# Patient Record
Sex: FEMALE | Race: ASIAN | Employment: UNEMPLOYED | URBAN - METROPOLITAN AREA
[De-identification: names, ages, dates, MRNs, and addresses within clinical notes are randomized per-mention and may not be internally consistent; named-entity substitution may affect disease eponyms.]

---

## 2015-11-04 LAB — LDL-C, EXTERNAL: 96

## 2015-12-01 LAB — COLONOSCOPY, EXTERNAL: NORMAL

## 2019-12-01 LAB — LDL-C, EXTERNAL: 96

## 2020-11-06 ENCOUNTER — TRANSCRIBE ORDER (OUTPATIENT)
Dept: SCHEDULING | Age: 55
End: 2020-11-06

## 2020-11-06 DIAGNOSIS — Z12.31 SCREENING MAMMOGRAM FOR HIGH-RISK PATIENT: Primary | ICD-10-CM

## 2020-11-27 ENCOUNTER — OFFICE VISIT (OUTPATIENT)
Dept: FAMILY MEDICINE CLINIC | Age: 55
End: 2020-11-27
Payer: OTHER GOVERNMENT

## 2020-11-27 VITALS
HEART RATE: 81 BPM | RESPIRATION RATE: 16 BRPM | SYSTOLIC BLOOD PRESSURE: 134 MMHG | BODY MASS INDEX: 22.1 KG/M2 | WEIGHT: 112.6 LBS | OXYGEN SATURATION: 99 % | DIASTOLIC BLOOD PRESSURE: 79 MMHG | HEIGHT: 60 IN | TEMPERATURE: 97.2 F

## 2020-11-27 DIAGNOSIS — Z12.31 ENCOUNTER FOR SCREENING MAMMOGRAM FOR MALIGNANT NEOPLASM OF BREAST: ICD-10-CM

## 2020-11-27 DIAGNOSIS — D12.6 ADENOMATOUS POLYP OF COLON, UNSPECIFIED PART OF COLON: ICD-10-CM

## 2020-11-27 DIAGNOSIS — E78.5 DYSLIPIDEMIA: ICD-10-CM

## 2020-11-27 DIAGNOSIS — Z00.01 ENCOUNTER FOR WELL ADULT EXAM WITH ABNORMAL FINDINGS: Primary | ICD-10-CM

## 2020-11-27 DIAGNOSIS — Z12.11 SCREENING FOR COLON CANCER: ICD-10-CM

## 2020-11-27 DIAGNOSIS — H40.9 GLAUCOMA OF LEFT EYE, UNSPECIFIED GLAUCOMA TYPE: ICD-10-CM

## 2020-11-27 DIAGNOSIS — Z11.59 ENCOUNTER FOR HEPATITIS C SCREENING TEST FOR LOW RISK PATIENT: ICD-10-CM

## 2020-11-27 PROBLEM — K63.5 COLON POLYP: Status: ACTIVE | Noted: 2020-11-27

## 2020-11-27 PROCEDURE — 99386 PREV VISIT NEW AGE 40-64: CPT | Performed by: FAMILY MEDICINE

## 2020-11-27 PROCEDURE — 99214 OFFICE O/P EST MOD 30 MIN: CPT | Performed by: FAMILY MEDICINE

## 2020-11-27 RX ORDER — LATANOPROST 50 UG/ML
SOLUTION/ DROPS OPHTHALMIC
COMMUNITY
Start: 2020-11-12

## 2020-11-27 RX ORDER — ADJUVANT AS01B/PF, VIAL 1 OF 2
1 VIAL (ML) INTRAMUSCULAR ONCE
Qty: 1 EACH | Refills: 1 | Status: SHIPPED | OUTPATIENT
Start: 2020-11-27 | End: 2020-11-27

## 2020-11-27 NOTE — PATIENT INSTRUCTIONS
1. Encounter for screening mammogram for malignant neoplasm of breast  Screening per orders, higher risk b/c of sister diagnosis before age 36, consider referral to breast center for high risk screening  - [facility] MAMMO BI SCREENING INCL CAD; Future    2. Glaucoma of left eye, unspecified glaucoma type  Mgmt per ophthalmology, compliance with drops    3. Adenomatous polyp of colon, unspecified part of colon  Recommend f/u cscope is due now, 5 y since last, referral placed  - REFERRAL TO GASTROENTEROLOGY    4. Screening for colon cancer  As above  - REFERRAL TO GASTROENTEROLOGY    5. Encounter for hepatitis C screening test for low risk patient  Routine screening  - HEPATITIS C AB; Future    6. Encounter for well adult exam with abnormal findings  Well adult, recommend healthy eating, exercise, maintain healthy weight and habits  Recommend routine screenings   Recommend vaccines and discussed today (pt declines flu shot)  Recommend annual visits  - [facility] MAMMO BI SCREENING INCL CAD; Future  - CBC W/O DIFF; Future  - METABOLIC PANEL, COMPREHENSIVE; Future  - LIPID PANEL; Future  - REFERRAL TO GASTROENTEROLOGY  - HEPATITIS C AB; Future  - varicella-zoster (Shingrix Adjuvant Component-PF) injection; 1 Each by IntraMUSCular route once for 1 dose. Indications: shingles vaccination  Dispense: 1 Each; Refill: 1  - REFERRAL TO OBSTETRICS AND GYNECOLOGY    7. Dyslipidemia  Recommend recheck with fasting labs, patient understanding, reviewed labs from 5 years ago in care everywhere   - CBC W/O DIFF; Future  - METABOLIC PANEL, COMPREHENSIVE; Future  - LIPID PANEL;  Future

## 2020-11-27 NOTE — PROGRESS NOTES
Chief Complaint   Patient presents with   [facility] Establish Care   1. Have you been to the ER, urgent care clinic since your last visit? Hospitalized since your last visit? No    2. Have you seen or consulted any other health care providers outside of the [address] [place] [address] since your last visit? Include any pap smears or colon screening.  No

## 2020-11-27 NOTE — PROGRESS NOTES
Family Medicine Initial Office Visit  Patient: Nelda Oates  1965, 54 y.o., female  Encounter Date: 11/27/2020    ASSESSMENT & PLAN    ICD-10-CM ICD-9-CM    1. Encounter for well adult exam with abnormal findings  Z00.01 V70.0 [facility] MAMMO BI SCREENING INCL CAD      CBC W/O DIFF      METABOLIC PANEL, COMPREHENSIVE      LIPID PANEL      REFERRAL TO GASTROENTEROLOGY      HEPATITIS C AB      varicella-zoster (Shingrix Adjuvant Component-PF) injection      REFERRAL TO OBSTETRICS AND GYNECOLOGY   2. Encounter for screening mammogram for malignant neoplasm of breast  Z12.31 V76.12 [facility] MAMMO BI SCREENING INCL CAD   3. Glaucoma of left eye, unspecified glaucoma type  H40.9 365.9    4. Adenomatous polyp of colon, unspecified part of colon  D12.6 211.3 REFERRAL TO GASTROENTEROLOGY   5. Screening for colon cancer  Z12.11 V76.51 REFERRAL TO GASTROENTEROLOGY   6. Encounter for hepatitis C screening test for low risk patient  Z11.59 V73.89 HEPATITIS C AB   7. Dyslipidemia  E78.5 272.4 CBC W/O DIFF      METABOLIC PANEL, COMPREHENSIVE      LIPID PANEL     Orders Placed This Encounter    [facility] MAMMO BI SCREENING INCL CAD     Standing Status:   Future     Standing Expiration Date:   5/30/2021     Order Specific Question:   Reason for Exam     Answer:   screening    AMB EXT LDL-C     This external order was created through the Results Console.  AMB EXT LDL-C     This external order was created through the Results Console.     CBC W/O DIFF     Standing Status:   Future     Standing Expiration Date:   85/31/1092    METABOLIC PANEL, COMPREHENSIVE     Standing Status:   Future     Standing Expiration Date:   11/27/2021    LIPID PANEL     Standing Status:   Future     Standing Expiration Date:   11/27/2021    HEPATITIS C AB     Standing Status:   Future     Standing Expiration Date:   11/27/2021   [facility]     Referral Priority:   Routine     Referral Type:   Consultation     Referral Reason:   Specialty Services Required     Referral Location:   [facility]     Referred to Provider:   Leighann Dee MD     Number of Visits Requested:   1   Elvira Kike OB/GYN ref [facility]     Referral Priority:   Routine     Referral Type:   Consultation     Referral Reason:   Specialty Services Required     Referred to Provider:   Mary Grace Luna MD     Number of Visits Requested:   1     COLONOSCOPY     This external order was created through the Results Console.  latanoprost (XALATAN) 0.005 % ophthalmic solution    varicella-zoster (Shingrix Adjuvant Component-PF) injection     Si Each by IntraMUSCular route once for 1 dose. Indications: shingles vaccination     Dispense:  1 Each     Refill:  1     Patient Instructions   1. Encounter for screening mammogram for malignant neoplasm of breast  Screening per orders, higher risk b/c of sister diagnosis before age 36, consider referral to breast center for high risk screening  - [facility] MAMMO BI SCREENING INCL CAD; Future    2. Glaucoma of left eye, unspecified glaucoma type  Mgmt per ophthalmology, compliance with drops    3. Adenomatous polyp of colon, unspecified part of colon  Recommend f/u cscope is due now, 5 y since last, referral placed  - REFERRAL TO GASTROENTEROLOGY    4. Screening for colon cancer  As above  - REFERRAL TO GASTROENTEROLOGY    5. Encounter for hepatitis C screening test for low risk patient  Routine screening  - HEPATITIS C AB; Future    6. Encounter for well adult exam with abnormal findings  Well adult, recommend healthy eating, exercise, maintain healthy weight and habits  Recommend routine screenings   Recommend vaccines and discussed today (pt declines flu shot)  Recommend annual visits  - [facility] MAMMO BI SCREENING INCL CAD; Future  - CBC W/O DIFF; Future  - METABOLIC PANEL, COMPREHENSIVE; Future  - LIPID PANEL; Future  - REFERRAL TO GASTROENTEROLOGY  - HEPATITIS C AB;  Future  - varicella-zoster (Shingrix Adjuvant Component-PF) injection; 1 Each by IntraMUSCular route once for 1 dose. Indications: shingles vaccination  Dispense: 1 Each; Refill: 1  - REFERRAL TO OBSTETRICS AND GYNECOLOGY    7. Dyslipidemia  Recommend recheck with fasting labs, patient understanding, reviewed labs from 5 years ago in care everywhere   - CBC W/O DIFF; Future  - METABOLIC PANEL, COMPREHENSIVE; Future  - LIPID PANEL; Future        CHIEF COMPLAINT  Chief Complaint   Patient presents with   Rio Saez Blend is a 54 y.o. female presenting today for establishing care. Patient works as not employed  Patient lives in a apt with  (has one 25year old college, a senior in college, home from [employer])  Patient was last seen by primary care over a year ago  Patient last saw a dentist last month  Patient last had eye exam a few weeks ago--[facility] [place]    Exercise: walking  Diet / Weight:healthy diet, stable    Tobacco:no  EtOH:no  Illicit Substances:no    Sexual Activity:yes one male partner  Last pap smear:   Last mammo: 2019 [facility] 1 neg    Patient was due for a physical, she knew she needed a mammo, her sister has a history of breast cancer, she was diagnosed at age 44 she reports to me. Lipids available from 2015 in chart, high HLD >90, LDL<100, TChol >200, TG 56    See care everywhere for last visits with her prior pcp and up to date labs that are available  Could not find pap in chart but do see cscope, hx of tubular adenoma  Review of Systems  A 12 point review of systems was negative except as noted here or in the HPI. OBJECTIVE  Visit Vitals  /79 (BP 1 Location: Left arm, BP Patient Position: Sitting)   Pulse 81   Temp 97.2 °F (36.2 °C) (Temporal)   Resp 16   Ht 5' (1.524 m)   Wt 112 lb 9.6 oz (51.1 kg)   SpO2 99%   BMI 21.99 kg/m²       Physical Exam  Vitals signs and nursing note reviewed. Constitutional:       General: She is not in acute distress. Appearance: Normal appearance. She is well-developed. She is not diaphoretic. HENT:      Head: Normocephalic and atraumatic. Right Ear: External ear normal.      Left Ear: External ear normal.      Mouth/Throat:      Comments: Mask not removed  Eyes:      General: No scleral icterus. Right eye: No discharge. Left eye: No discharge. Extraocular Movements: Extraocular movements intact. Conjunctiva/sclera: Conjunctivae normal.      Pupils: Pupils are equal, round, and reactive to light. Neck:      Musculoskeletal: Normal range of motion and neck supple. Vascular: No carotid bruit. Cardiovascular:      Rate and Rhythm: Normal rate and regular rhythm. Heart sounds: Normal heart sounds. No murmur. No friction rub. No gallop. Pulmonary:      Effort: Pulmonary effort is normal. No respiratory distress. Breath sounds: Normal breath sounds. No stridor. No wheezing, rhonchi or rales. Abdominal:      General: Bowel sounds are normal. There is no distension. Palpations: Abdomen is soft. Tenderness: There is no abdominal tenderness. Musculoskeletal: Normal range of motion. General: No tenderness. Right lower leg: No edema. Left lower leg: No edema. Lymphadenopathy:      Cervical: No cervical adenopathy. Skin:     General: Skin is warm and dry. Capillary Refill: Capillary refill takes less than 2 seconds. Findings: No rash. Neurological:      General: No focal deficit present. Mental Status: She is alert and oriented to person, place, and time. Coordination: Coordination normal.      Gait: Gait normal.   Psychiatric:         Mood and Affect: Mood normal.         Behavior: Behavior normal.         Thought Content:  Thought content normal.         Judgment: Judgment normal.         Results for orders placed or performed in visit on 11/27/20   AMB EXT LDL-C   Result Value Ref Range    LDL-C, External 96    AMB EXT LDL-C   Result Value Ref Range    LDL-C, External 96     COLONOSCOPY   Result Value Ref Range    Colonoscopy, External one polyp        HISTORICAL  Reviewed and updated today, and as noted below:    History reviewed. No pertinent past medical history. History reviewed. No pertinent surgical history. Family History   Problem Relation Age of Onset    No Known Problems Mother     Cancer Father     Cancer Sister      Social History     Tobacco Use   Smoking Status Never Smoker   Smokeless Tobacco Never Used     Social History     Socioeconomic History    Marital status:      Spouse name: Not on file    Number of children: Not on file    Years of education: Not on file    Highest education level: Not on file   Tobacco Use    Smoking status: Never Smoker    Smokeless tobacco: Never Used     No Known Allergies    Office Visit on 11/27/2020   Component Date Value Ref Range Status    Colonoscopy, External 12/01/2015 one polyp   Final    LDL-C, External 12/01/2019 96   Final    LDL-C, External 11/04/2015 96   Final         Tyler De Paz MD  [facility]  11/27/20 10:21 AM    Portions of this note may have been populated using smart dictation software and may have \"sounds-like\" errors present. Pt was counseled on risks, benefits and alternatives of treatment options. All questions were asked and answered and the patient was agreeable with the treatment plan as outlined.

## 2020-11-30 DIAGNOSIS — Z11.59 ENCOUNTER FOR HEPATITIS C SCREENING TEST FOR LOW RISK PATIENT: ICD-10-CM

## 2020-11-30 DIAGNOSIS — Z00.01 ENCOUNTER FOR WELL ADULT EXAM WITH ABNORMAL FINDINGS: ICD-10-CM

## 2020-11-30 DIAGNOSIS — E78.5 DYSLIPIDEMIA: ICD-10-CM

## 2020-11-30 LAB
ALBUMIN SERPL-MCNC: 4.1 G/DL (ref 3.5–5)
ALBUMIN/GLOB SERPL: 1.1 {RATIO} (ref 1.1–2.2)
ALP SERPL-CCNC: 58 U/L (ref 45–117)
ALT SERPL-CCNC: 33 U/L (ref 12–78)
ANION GAP SERPL CALC-SCNC: 5 MMOL/L (ref 5–15)
AST SERPL-CCNC: 24 U/L (ref 15–37)
BILIRUB SERPL-MCNC: 0.6 MG/DL (ref 0.2–1)
BUN SERPL-MCNC: 8 MG/DL (ref 6–20)
BUN/CREAT SERPL: 13 (ref 12–20)
CALCIUM SERPL-MCNC: 9.5 MG/DL (ref 8.5–10.1)
CHLORIDE SERPL-SCNC: 107 MMOL/L (ref 97–108)
CHOLEST SERPL-MCNC: 263 MG/DL
CO2 SERPL-SCNC: 29 MMOL/L (ref 21–32)
CREAT SERPL-MCNC: 0.63 MG/DL (ref 0.55–1.02)
ERYTHROCYTE [DISTWIDTH] IN BLOOD BY AUTOMATED COUNT: 11.9 % (ref 11.5–14.5)
GLOBULIN SER CALC-MCNC: 3.8 G/DL (ref 2–4)
GLUCOSE SERPL-MCNC: 87 MG/DL (ref 65–100)
HCT VFR BLD AUTO: 43.5 % (ref 35–47)
HCV AB SERPL QL IA: NONREACTIVE
HCV COMMENT,HCGAC: NORMAL
HDLC SERPL-MCNC: 92 MG/DL
HDLC SERPL: 2.9 {RATIO} (ref 0–5)
HGB BLD-MCNC: 14.1 G/DL (ref 11.5–16)
LDLC SERPL CALC-MCNC: 159.6 MG/DL (ref 0–100)
LIPID PROFILE,FLP: ABNORMAL
MCH RBC QN AUTO: 30.7 PG (ref 26–34)
MCHC RBC AUTO-ENTMCNC: 32.4 G/DL (ref 30–36.5)
MCV RBC AUTO: 94.6 FL (ref 80–99)
NRBC # BLD: 0 K/UL (ref 0–0.01)
NRBC BLD-RTO: 0 PER 100 WBC
PLATELET # BLD AUTO: 295 K/UL (ref 150–400)
PMV BLD AUTO: 9.9 FL (ref 8.9–12.9)
POTASSIUM SERPL-SCNC: 4.3 MMOL/L (ref 3.5–5.1)
PROT SERPL-MCNC: 7.9 G/DL (ref 6.4–8.2)
RBC # BLD AUTO: 4.6 M/UL (ref 3.8–5.2)
SODIUM SERPL-SCNC: 141 MMOL/L (ref 136–145)
TRIGL SERPL-MCNC: 57 MG/DL (ref ?–150)
VLDLC SERPL CALC-MCNC: 11.4 MG/DL
WBC # BLD AUTO: 6.1 K/UL (ref 3.6–11)

## 2020-12-02 NOTE — PROGRESS NOTES
Hep c screening negative  Blood count normal  Electrolytes, liver and kidney functions are normal  Borderline high LDL (lousy) cholesterol and high HDL cholesterol are combining to make total cholesterol high.  I would not recommend yet using a medication approach for this, healthy habits and exercise are goals but this is likely inherited or genetic and at some point we might need to talk about using medications to modify your vascular/cardiac risk

## 2020-12-11 ENCOUNTER — TELEPHONE (OUTPATIENT)
Dept: FAMILY MEDICINE CLINIC | Age: 55
End: 2020-12-11

## 2020-12-11 DIAGNOSIS — H40.1122 PRIMARY OPEN-ANGLE GLAUCOMA, LEFT EYE, MODERATE STAGE: Primary | ICD-10-CM

## 2020-12-11 NOTE — TELEPHONE ENCOUNTER
Radha referral request faxed by [facility] for pt's appointment today with Dr. Dustin Lemus. Diagnosis code H40.1122.  Ldm

## 2020-12-11 NOTE — TELEPHONE ENCOUNTER
Radha referral submitted, but per plan, pt is not enrolled in [facility] so no referral is required for this type of service. Faxed copy of this notice to Dr. Carley Jones office for billing purposes.  Eri

## 2020-12-13 ENCOUNTER — HOSPITAL ENCOUNTER (OUTPATIENT)
Dept: LAB | Age: 55
Discharge: HOME | End: 2020-12-13
Payer: OTHER GOVERNMENT

## 2020-12-13 ENCOUNTER — TRANSCRIBE ORDER (OUTPATIENT)
Dept: REGISTRATION | Age: 55
End: 2020-12-13

## 2020-12-13 DIAGNOSIS — Z01.812 PRE-PROCEDURAL LABORATORY EXAMINATIONS: ICD-10-CM

## 2020-12-13 DIAGNOSIS — Z01.812 PRE-PROCEDURAL LABORATORY EXAMINATIONS: Primary | ICD-10-CM

## 2020-12-13 PROCEDURE — 87635 SARS-COV-2 COVID-19 AMP PRB: CPT

## 2020-12-14 LAB — SARS-COV-2, COV2NT: NOT DETECTED

## 2020-12-15 NOTE — PROGRESS NOTES
[facility] - [place], [place]  Endoscopy Preprocedure Instructions      1. On the day of your surgery, please report to registration located on the 2nd floor of the  [facility]. yes    2. You must have a responsible adult to drive you to the hospital, stay at the hospital during your procedure and drive you home. If they leave your procedure will not be started (no exceptions). yes    3. Do not have anything to eat or drink (including water, gum, mints, coffee, and juice) after midnight. This does not apply to the medications you were instructed to take by your physician. yesIf you are currently taking Plavix, Coumadin, Aspirin, or other blood-thinning agents, contact your physician for special instructions. not applicable,    4. If you are having a procedure that requires bowel prep: We recommend that you have only clear liquids the day before your procedure and begin your bowel prep by 5:00 pm.  You may continue to drink clear liquids until midnight. If for any reason you are not able to complete your prep please notify your physician immediately. yes    5. Have a list of all current medications, including vitamins, herbal supplements and any other over the counter medications. yes    6. If you wear glasses, contacts, dentures and/or hearing aids, they may be removed prior to procedure, please bring a case to store them in. yes    7. You should understand that if you do not follow these instructions your procedure may be cancelled. If your physical condition changes (I.e. fever, cold or flu) please contact your doctor as soon as possible. 8. It is important that you be on time.   If for any reason you are unable to keep your appointment please call )- the day of or your physicians office prior to your scheduled procedure

## 2020-12-17 ENCOUNTER — HOSPITAL ENCOUNTER (OUTPATIENT)
Age: 55
Setting detail: OUTPATIENT SURGERY
Discharge: HOME | End: 2020-12-17
Attending: INTERNAL MEDICINE | Admitting: INTERNAL MEDICINE
Payer: OTHER GOVERNMENT

## 2020-12-17 ENCOUNTER — ANESTHESIA EVENT (OUTPATIENT)
Dept: ENDOSCOPY | Age: 55
End: 2020-12-17
Payer: OTHER GOVERNMENT

## 2020-12-17 ENCOUNTER — ANESTHESIA (OUTPATIENT)
Dept: ENDOSCOPY | Age: 55
End: 2020-12-17
Payer: OTHER GOVERNMENT

## 2020-12-17 VITALS
SYSTOLIC BLOOD PRESSURE: 110 MMHG | HEART RATE: 72 BPM | HEIGHT: 60 IN | BODY MASS INDEX: 21.81 KG/M2 | WEIGHT: 111.11 LBS | RESPIRATION RATE: 18 BRPM | DIASTOLIC BLOOD PRESSURE: 68 MMHG | OXYGEN SATURATION: 100 % | TEMPERATURE: 97.8 F

## 2020-12-17 PROCEDURE — 76040000019: Performed by: INTERNAL MEDICINE

## 2020-12-17 PROCEDURE — 74011250636 HC RX REV CODE- 250/636: Performed by: NURSE ANESTHETIST, CERTIFIED REGISTERED

## 2020-12-17 PROCEDURE — 76060000031 HC ANESTHESIA FIRST 0.5 HR: Performed by: INTERNAL MEDICINE

## 2020-12-17 PROCEDURE — 74011000250 HC RX REV CODE- 250: Performed by: NURSE ANESTHETIST, CERTIFIED REGISTERED

## 2020-12-17 PROCEDURE — 2709999900 HC NON-CHARGEABLE SUPPLY: Performed by: INTERNAL MEDICINE

## 2020-12-17 RX ORDER — ATROPINE SULFATE 0.1 MG/ML
0.4 INJECTION INTRAVENOUS
Status: DISCONTINUED | OUTPATIENT
Start: 2020-12-17 | End: 2020-12-17 | Stop reason: HOSPADM

## 2020-12-17 RX ORDER — PROPOFOL 10 MG/ML
INJECTION, EMULSION INTRAVENOUS AS NEEDED
Status: DISCONTINUED | OUTPATIENT
Start: 2020-12-17 | End: 2020-12-17 | Stop reason: HOSPADM

## 2020-12-17 RX ORDER — DEXTROMETHORPHAN/PSEUDOEPHED 2.5-7.5/.8
1.2 DROPS ORAL
Status: DISCONTINUED | OUTPATIENT
Start: 2020-12-17 | End: 2020-12-17 | Stop reason: HOSPADM

## 2020-12-17 RX ORDER — MIDAZOLAM HYDROCHLORIDE 1 MG/ML
.25-5 INJECTION, SOLUTION INTRAMUSCULAR; INTRAVENOUS
Status: DISCONTINUED | OUTPATIENT
Start: 2020-12-17 | End: 2020-12-17 | Stop reason: HOSPADM

## 2020-12-17 RX ORDER — LIDOCAINE HYDROCHLORIDE 20 MG/ML
INJECTION, SOLUTION EPIDURAL; INFILTRATION; INTRACAUDAL; PERINEURAL AS NEEDED
Status: DISCONTINUED | OUTPATIENT
Start: 2020-12-17 | End: 2020-12-17 | Stop reason: HOSPADM

## 2020-12-17 RX ORDER — SODIUM CHLORIDE 9 MG/ML
INJECTION, SOLUTION INTRAVENOUS
Status: DISCONTINUED | OUTPATIENT
Start: 2020-12-17 | End: 2020-12-17 | Stop reason: HOSPADM

## 2020-12-17 RX ORDER — EPINEPHRINE 0.1 MG/ML
1 INJECTION INTRACARDIAC; INTRAVENOUS
Status: DISCONTINUED | OUTPATIENT
Start: 2020-12-17 | End: 2020-12-17 | Stop reason: HOSPADM

## 2020-12-17 RX ORDER — FLUMAZENIL 0.1 MG/ML
0.2 INJECTION INTRAVENOUS
Status: DISCONTINUED | OUTPATIENT
Start: 2020-12-17 | End: 2020-12-17 | Stop reason: HOSPADM

## 2020-12-17 RX ORDER — NALOXONE HYDROCHLORIDE 0.4 MG/ML
0.4 INJECTION, SOLUTION INTRAMUSCULAR; INTRAVENOUS; SUBCUTANEOUS
Status: DISCONTINUED | OUTPATIENT
Start: 2020-12-17 | End: 2020-12-17 | Stop reason: HOSPADM

## 2020-12-17 RX ORDER — PROPOFOL 10 MG/ML
INJECTION, EMULSION INTRAVENOUS
Status: DISCONTINUED | OUTPATIENT
Start: 2020-12-17 | End: 2020-12-17 | Stop reason: HOSPADM

## 2020-12-17 RX ORDER — SODIUM CHLORIDE 9 MG/ML
50 INJECTION, SOLUTION INTRAVENOUS CONTINUOUS
Status: DISCONTINUED | OUTPATIENT
Start: 2020-12-17 | End: 2020-12-17 | Stop reason: HOSPADM

## 2020-12-17 RX ADMIN — LIDOCAINE HYDROCHLORIDE 20 MG: 20 INJECTION, SOLUTION INTRAVENOUS at 11:07

## 2020-12-17 RX ADMIN — PROPOFOL 120 MCG/KG/MIN: 10 INJECTION, EMULSION INTRAVENOUS at 11:06

## 2020-12-17 RX ADMIN — SODIUM CHLORIDE: 9 INJECTION, SOLUTION INTRAVENOUS at 11:00

## 2020-12-17 RX ADMIN — PROPOFOL 40 MG: 10 INJECTION, EMULSION INTRAVENOUS at 11:07

## 2020-12-17 NOTE — PROGRESS NOTES
Endoscopy discharge instructions have been reviewed and given to patient. The patient verbalized understanding and acceptance of instructions. Dr. Corina Ball discussed with patient procedure findings and next steps.

## 2020-12-17 NOTE — H&P
Yossi Conroy M.D.  [phone]            History and Physical       NAME:  Stella Robison   :   1965   MRN:   [id number]       Referring Physician:  Dr. Alonso Parks Date: 2020 11:05 AM    Chief Complaint:  Colon cancer screening    History of Present Illness:  Patient is a 54 y.o. who is seen for colon cancer screening. Denies any ongoing GI complaints. PMH:  History reviewed. No pertinent past medical history. PSH:  History reviewed. No pertinent surgical history. Allergies:  No Known Allergies    Home Medications:  Prior to Admission Medications   Prescriptions Last Dose Informant Patient Reported?  Taking?   latanoprost (XALATAN) 0.005 % ophthalmic solution 2020 at Unknown time  Yes Yes      Facility-Administered Medications: None       Hospital Medications:  Current Facility-Administered Medications   Medication Dose Route Frequency    0.9% sodium chloride infusion  50 mL/hr IntraVENous CONTINUOUS    midazolam (VERSED) injection 0.25-5 mg  0.25-5 mg IntraVENous Multiple    naloxone (NARCAN) injection 0.4 mg  0.4 mg IntraVENous Multiple    flumazeniL (ROMAZICON) 0.1 mg/mL injection 0.2 mg  0.2 mg IntraVENous Multiple    simethicone (MYLICON) 43YZ/4.8WS oral drops 80 mg  1.2 mL Oral Multiple    atropine injection 0.4 mg  0.4 mg IntraVENous ONCE PRN    EPINEPHrine (ADRENALIN) 0.1 mg/mL syringe 1 mg  1 mg Endoscopically ONCE PRN     Facility-Administered Medications Ordered in Other Encounters   Medication Dose Route Frequency    0.9% sodium chloride infusion   IntraVENous CONTINUOUS       Social History:  Social History     Tobacco Use    Smoking status: Never Smoker    Smokeless tobacco: Never Used   Substance Use Topics    Alcohol use: Not Currently       Family History:  Family History   Problem Relation Age of Onset    No Known Problems Mother     Cancer Father     Cancer Sister              Review of Systems:      Constitutional: negative fever, negative chills, negative weight loss  Eyes:   negative visual changes  ENT:   negative sore throat, tongue or lip swelling  Respiratory:  negative cough, negative dyspnea  Cards:  negative for chest pain, palpitations, lower extremity edema  GI:   See HPI  :  negative for frequency, dysuria  Integument:  negative for rash and pruritus  Heme:  negative for easy bruising and gum/nose bleeding  Musculoskel: negative for myalgias,  back pain and muscle weakness  Neuro: negative for headaches, dizziness, vertigo  Psych:  negative for feelings of anxiety, depression       Objective:     Patient Vitals for the past 8 hrs:   BP Temp Pulse Resp SpO2 Height Weight   12/17/20 1007 111/72 98.3 °F (36.8 °C) 80 18 97 % 5' (1.524 m) 50.4 kg (111 lb 1.8 oz)     No intake/output data recorded. No intake/output data recorded. EXAM:     NEURO-a&o   HEENT-wnl   LUNGS-clear    COR-regular rate and rhythym     ABD-soft , no tenderness, no rebound, good bs     EXT-no edema     Data Review     No results for input(s): WBC, HGB, HCT, PLT, HGBEXT, HCTEXT, PLTEXT in the last 72 hours. No results for input(s): NA, K, CL, CO2, BUN, CREA, GLU, PHOS, CA in the last 72 hours. No results for input(s): AP, TBIL, TP, ALB, GLOB, GGT, AML, LPSE in the last 72 hours. No lab exists for component: SGOT, GPT, AMYP, HLPSE  No results for input(s): INR, PTP, APTT, INREXT in the last 72 hours. Patient Active Problem List   Diagnosis Code    Colon polyp K63.5    Family history of breast cancer in sister Z80.2      Assessment:   · Colon cancer screening   Plan:   · Colonoscopy today.      Signed By: Natalee Heredia MD     12/17/2020  11:05 AM

## 2020-12-17 NOTE — ANESTHESIA POSTPROCEDURE EVALUATION
Procedure(s):  COLONOSCOPY.     MAC    Anesthesia Post Evaluation      Multimodal analgesia: multimodal analgesia used between 6 hours prior to anesthesia start to PACU discharge  Patient location during evaluation: PACU  Patient participation: complete - patient participated  Level of consciousness: awake and alert  Pain management: adequate  Airway patency: patent  Anesthetic complications: no  Cardiovascular status: acceptable  Respiratory status: acceptable  Hydration status: acceptable  Post anesthesia nausea and vomiting:  none      INITIAL Post-op Vital signs:   Vitals Value Taken Time   /68 12/17/20 1152   Temp 36.6 °C (97.8 °F) 12/17/20 1131   Pulse 72 12/17/20 1152   Resp 18 12/17/20 1152   SpO2 100 % 12/17/20 1152

## 2020-12-17 NOTE — ANESTHESIA PREPROCEDURE EVALUATION
Relevant Problems   No relevant active problems       Anesthetic History   No history of anesthetic complications            Review of Systems / Medical History  Patient summary reviewed, nursing notes reviewed and pertinent labs reviewed    Pulmonary  Within defined limits            Pertinent negatives: No asthma and recent URI     Neuro/Psych   Within defined limits           Cardiovascular  Within defined limits              Pertinent negatives: No hypertension  Exercise tolerance: >4 METS     GI/Hepatic/Renal  Within defined limits              Endo/Other  Within defined limits           Other Findings              Physical Exam    Airway  Mallampati: I  TM Distance: 4 - 6 cm  Neck ROM: normal range of motion   Mouth opening: Normal     Cardiovascular  Regular rate and rhythm,  S1 and S2 normal,  no murmur, click, rub, or gallop             Dental    Dentition: Lower dentition intact and Upper dentition intact     Pulmonary  Breath sounds clear to auscultation               Abdominal         Other Findings            Anesthetic Plan    ASA: 1  Anesthesia type: MAC          Induction: Intravenous  Anesthetic plan and risks discussed with: Patient

## 2020-12-17 NOTE — PROGRESS NOTES
Miladisian Estrada  1965  [id number]    Situation:  Verbal report received from: Marija Michel RN   Procedure: Procedure(s):  COLONOSCOPY    Background:    Preoperative diagnosis: SCREENING  Postoperative diagnosis: Diverticulosis/hemorrhoids    :  Dr. Madison Keenan  Assistant(s): Endoscopy Technician-1: Sanjeev Mercado  Endoscopy RN-1: Roberto Olguin RN    Specimens: * No specimens in log *  H. Pylori  no    Assessment:    Anesthesia gave intra-procedure sedation and medications, see anesthesia flow sheet no    Intravenous fluids: NS@ KVO     Vital signs stable     Abdominal assessment: round and soft     Recommendation:  Discharge patient per MD order.   Return to floor  Family or Friend   Permission to share finding with family or friend yes

## 2020-12-17 NOTE — DISCHARGE INSTRUCTIONS
[facility]. Lacey Vidal M.D.  [phone]            COLON DISCHARGE INSTRUCTIONS       2020    Valerio Estrada  :  1965  [facility] Medical Record Number:  [id number]      COLONOSCOPY FINDINGS:  Your colonoscopy showed mild diverticulosis and small internal hemorrhoids, otherwise mucosa within normal throughout the colon. DISCOMFORT:  Redness at IV site- apply warm compress to area; if redness or soreness persist- contact your physician  There may be a slight amount of blood passed from the rectum  Gaseous discomfort- walking, belching will help relieve any discomfort  You may not operate a vehicle for 12 hours  You may not engage in an occupation involving machinery or appliances for rest of today  You may not drink alcoholic beverages for at least 12 hours  Avoid making any critical decisions for at least 24 hour  DIET:   High fiber diet. - however -  remember your colon is empty and a heavy meal will produce gas. Avoid these foods:  vegetables, fried / greasy foods, carbonated drinks for today     ACTIVITY:  You may resume your normal daily activities it is recommended that you spend the remainder of the day resting -  avoid any strenuous activity. CALL M.D. ANY SIGN OF:   Increasing pain, nausea, vomiting  Abdominal distension (swelling)  New increased bleeding (oral or rectal)  Fever (chills)  Pain in chest area  Bloody discharge from nose or mouth   Shortness of breath    Follow-up Instructions:   Call Dr. Mack Darnell if any questions or problems. Telephone # [phone]    Should have a repeat colonoscopy in 5 years.

## 2020-12-17 NOTE — PROCEDURES
Amador Morales M.D.  [phone]            2020          Colonoscopy Operative Report  Doris Sepulveda  :  1965  [facility] Medical Record Number:  [id number]      Indications:    Personal history of colon polyps (screening only)     :  Tho Isaac MD    Referring Provider: Dulce Travis MD    Sedation:  MAC anesthesia    Pre-Procedural Exam:      Airway: clear,  No airway problems anticipated  Heart: RRR, without gallops or rubs  Lungs: clear bilaterally without wheezes, crackles, or rhonchi  Abdomen: soft, nontender, nondistended, bowel sounds present  Mental Status: awake, alert and oriented to person, place and time     Procedure Details:  After informed consent was obtained with all risks and benefits of procedure explained and preoperative exam completed, the patient was taken to the endoscopy suite and placed in the left lateral decubitus position. Upon sequential sedation as per above, a digital rectal exam was performed. The Olympus videocolonoscope  was inserted in the rectum and carefully advanced to the cecum, which was identified by the ileocecal valve. The quality of preparation was good. The colonoscope was slowly withdrawn with careful inspection and evaluation between folds. Retroflexion in the rectum was performed. Findings:   Terminal Ileum: not intubated  Cecum: normal  Ascending Colon: no mucosal lesion appreciated  mild diverticulosis;  Transverse Colon: normal  Descending Colon: normal  Sigmoid: normal  Rectum: no mucosal lesion appreciated  Grade 1 internal hemorrhoid(s); Interventions:  none    Specimen Removed:  none    Complications: None. EBL:  None. Impression:  Mild diverticulosis and small internal hemorrhoids, otherwise mucosa within normal    Recommendations:  -Repeat colonoscopy in 5 years.   -High fiber diet.    -Resume normal medication(s).      Discharge Disposition:  Home in the company of a  when able to ambulate.     Neris Dyer MD  12/17/2020  11:24 AM

## 2020-12-18 ENCOUNTER — OFFICE VISIT (OUTPATIENT)
Dept: OBGYN CLINIC | Age: 55
End: 2020-12-18
Payer: OTHER GOVERNMENT

## 2020-12-18 VITALS — BODY MASS INDEX: 21.99 KG/M2 | HEIGHT: 60 IN | WEIGHT: 112 LBS

## 2020-12-18 DIAGNOSIS — Z11.51 SCREENING FOR HPV (HUMAN PAPILLOMAVIRUS): ICD-10-CM

## 2020-12-18 DIAGNOSIS — Z01.419 ENCNTR FOR GYN EXAM (GENERAL) (ROUTINE) W/O ABN FINDINGS: Primary | ICD-10-CM

## 2020-12-18 PROCEDURE — 99386 PREV VISIT NEW AGE 40-64: CPT | Performed by: OBSTETRICS & GYNECOLOGY

## 2020-12-18 NOTE — PROGRESS NOTES
Stella Robison is a No obstetric history on file. ,  54 y.o. female   who presents for her annual checkup. She is having no significant problems. Recently moved from [place], [place]. Menstrual status:    Her periods are absent in flow. She denies dysmenorrhea. She reports no premenstrual symptoms. The patient is not using HRT. Contraception:    The current method of family planning is post menopausal status. Sexual history:    She  has no history on file for sexual activity. Medical conditions:    Since her last annual GYN exam about 2015, per patient she has had the following changes in her health history: none. Pap and Mammogram History:    Her most recent Pap smear was normal obtained 2015, per patient. The patient has her mammogram scheduled next month. .    Breast Cancer History/Substance Abuse:    She has a family history of breast cancer. Sister at age 45. Osteoporosis History:    Family history does not include a first or second degree relative with osteopenia or osteoporosis. A bone density scan was not obtained. She is currently not taking calcium and vit D. No past medical history on file. Past Surgical History:   Procedure Laterality Date    COLONOSCOPY N/A 12/17/2020    COLONOSCOPY performed by Margery Skiff, MD at [facility] END[facility]     Current Outpatient Medications   Medication Sig Dispense Refill    latanoprost (XALATAN) 0.005 % ophthalmic solution        Allergies: Patient has no known allergies.    Social History     Socioeconomic History    Marital status:      Spouse name: Not on file    Number of children: Not on file    Years of education: Not on file    Highest education level: Not on file   Occupational History    Not on file   Social Needs    Financial resource strain: Not on file    Food insecurity     Worry: Not on file     Inability: Not on file    Transportation needs     Medical: Not on file     Non-medical: Not on file Tobacco Use    Smoking status: Never Smoker    Smokeless tobacco: Never Used   Substance and Sexual Activity    Alcohol use: Not Currently    Drug use: Not Currently    Sexual activity: Not on file   Lifestyle    Physical activity     Days per week: Not on file     Minutes per session: Not on file    Stress: Not on file   Relationships    Social connections     Talks on phone: Not on file     Gets together: Not on file     Attends Confucianist service: Not on file     Active member of club or organization: Not on file     Attends meetings of clubs or organizations: Not on file     Relationship status: Not on file    Intimate partner violence     Fear of current or ex partner: Not on file     Emotionally abused: Not on file     Physically abused: Not on file     Forced sexual activity: Not on file   Other Topics Concern    Not on file   Social History Narrative    Not on file     Tobacco History:  reports that she has never smoked. She has never used smokeless tobacco.  Alcohol Abuse:  reports previous alcohol use. Drug Abuse:  reports previous drug use.   Patient Active Problem List   Diagnosis Code    Colon polyp K63.5    Family history of breast cancer in sister Z80.2         Review of Systems - History obtained from the patient  Constitutional: negative for weight loss, fever, night sweats  HEENT: negative for hearing loss, earache, congestion, snoring, sorethroat  CV: negative for chest pain, palpitations, edema  Resp: negative for cough, shortness of breath, wheezing  GI: negative for change in bowel habits, abdominal pain, black or bloody stools  : negative for frequency, dysuria, hematuria, vaginal discharge  MSK: negative for back pain, joint pain, muscle pain  Breast: negative for breast lumps, nipple discharge, galactorrhea  Skin :negative for itching, rash, hives  Neuro: negative for dizziness, headache, confusion, weakness  Psych: negative for anxiety, depression, change in mood  Heme/lymph: negative for bleeding, bruising, pallor    Physical Exam    Visit Vitals  Ht 5' (1.524 m)   Wt 112 lb (50.8 kg)   BMI 21.87 kg/m²     Constitutional  · Appearance: well-nourished, well developed, alert, in no acute distress    HENT  · Head and Face: appears normal    Neck  · Inspection/Palpation: normal appearance, no masses or tenderness  · Lymph Nodes: no lymphadenopathy present  · Thyroid: gland size normal, nontender, no nodules or masses present on palpation    Chest  · Respiratory Effort: breathing normal  · Auscultation: normal breath sounds    Cardiovascular  · Heart:  · Auscultation: regular rate and rhythm without murmur    Breasts  · Inspection of Breasts: breasts symmetrical, no skin changes, no discharge present, nipple appearance normal, no skin retraction present  · Palpation of Breasts and Axillae: no masses present on palpation, no breast tenderness  · Axillary Lymph Nodes: no lymphadenopathy present    Gastrointestinal  · Abdominal Examination: abdomen non-tender to palpation, normal bowel sounds, no masses present  · Liver and spleen: no hepatomegaly present, spleen not palpable  · Hernias: no hernias identified    Skin  · General Inspection: no rash, no lesions identified    Neurologic/Psychiatric  · Mental Status:  · Orientation: grossly oriented to person, place and time  · Mood and Affect: mood normal, affect appropriate    Genitourinary  · External Genitalia: normal appearance for age, no discharge present, no tenderness present, no inflammatory lesions present, no masses present, no atrophy present  · Vagina: normal vaginal vault without central or paravaginal defects, no discharge present, no inflammatory lesions present, no masses present  · Bladder: non-tender to palpation  · Urethra: appears normal  · Cervix: normal   · Uterus: normal size, shape and consistency  · Adnexa: no adnexal tenderness present, no adnexal masses present  · Perineum: perineum within normal limits, no evidence of trauma, no rashes or skin lesions present  · Anus: anus within normal limits, no hemorrhoids present  · Inguinal Lymph Nodes: no lymphadenopathy present    Assessment:  Routine gynecologic examination  Her current medical status is satisfactory with no evidence of significant gynecologic issues.     Plan:  Counseled re: diet, exercise, healthy lifestyle  Return for yearly wellness visits  Rec annual mammogram  Pap/HPV

## 2020-12-18 NOTE — PROGRESS NOTES
Stella Robison is a No obstetric history on file. ,  54 y.o. female  whose LMP was on  who presents for her annual checkup. She is having {problem:21965}. Menstrual status: 
 
Her periods are absent She denies dysmenorrhea. She reports no premenstrual symptoms. The patient is not using HRT. Contraception: The current method of family planning is post menopausal status. Sexual history: She  has no history on file for sexual activity. Medical conditions: 
 
Since her last annual GYN exam about {years:21964} ago, she has had the following changes in her health history: none. Pap and Mammogram History: 
 
Her most recent Pap smear was{NORMAL_ABNORMAL:20128::\"see report\"} obtained {Numbers; 1-4 :[id number]} year(s) ago. The patient is scheduled for mammogram 1/5/2021. Breast Cancer History/Substance Abuse: She has {No/  **:46500::\"a\"} family history of breast cancer. Osteoporosis History: 
 
Family history does not include a first or second degree relative with osteopenia or osteoporosis. A bone density scan was obtained *** and revealed a normal scan, osteopenia, osteoporosis. She is currently taking calcium and vit D. No past medical history on file. Past Surgical History:  
Procedure Laterality Date  COLONOSCOPY N/A 12/17/2020 COLONOSCOPY performed by Margery Skiff, MD at [address] [place] [address] Current Outpatient Medications Medication Sig Dispense Refill  latanoprost (XALATAN) 0.005 % ophthalmic solution Allergies: Patient has no known allergies. Social History Socioeconomic History  Marital status:  Spouse name: Not on file  Number of children: Not on file  Years of education: Not on file  Highest education level: Not on file Occupational History  Not on file Social Needs  Financial resource strain: Not on file  Food insecurity Worry: Not on file Inability: Not on file  Transportation needs Medical: Not on file Non-medical: Not on file Tobacco Use  Smoking status: Never Smoker  Smokeless tobacco: Never Used Substance and Sexual Activity  Alcohol use: Not Currently  Drug use: Not Currently  Sexual activity: Not on file Lifestyle  Physical activity Days per week: Not on file Minutes per session: Not on file  Stress: Not on file Relationships  Social connections Talks on phone: Not on file Gets together: Not on file Attends Christian service: Not on file Active member of club or organization: Not on file Attends meetings of clubs or organizations: Not on file Relationship status: Not on file  Intimate partner violence Fear of current or ex partner: Not on file Emotionally abused: Not on file Physically abused: Not on file Forced sexual activity: Not on file Other Topics Concern  Not on file Social History Narrative  Not on file Tobacco History:  reports that she has never smoked. She has never used smokeless tobacco. 
Alcohol Abuse:  reports previous alcohol use. Drug Abuse:  reports previous drug use. Patient Active Problem List  
Diagnosis Code  Colon polyp K63.5  Family history of breast cancer in sister Z80.2 Review of Systems - History obtained from the patient Constitutional: negative for weight loss, fever, night sweats HEENT: negative for hearing loss, earache, congestion, snoring, sorethroat CV: negative for chest pain, palpitations, edema Resp: negative for cough, shortness of breath, wheezing GI: negative for change in bowel habits, abdominal pain, black or bloody stools : negative for frequency, dysuria, hematuria, vaginal discharge MSK: negative for back pain, joint pain, muscle pain Breast: negative for breast lumps, nipple discharge, galactorrhea Skin :negative for itching, rash, hives Neuro: negative for dizziness, headache, confusion, weakness Psych: negative for anxiety, depression, change in mood Heme/lymph: negative for bleeding, bruising, pallor Physical Exam 
 
There were no vitals taken for this visit. Constitutional 
· Appearance: well-nourished, well developed, alert, in no acute distress HENT 
· Head and Face: appears normal 
 
Neck · Inspection/Palpation: normal appearance, no masses or tenderness · Lymph Nodes: no lymphadenopathy present · Thyroid: gland size normal, nontender, no nodules or masses present on palpation Chest 
· Respiratory Effort: breathing normal 
· Auscultation: normal breath sounds Cardiovascular · Heart: 
· Auscultation: regular rate and rhythm without murmur Breasts · Inspection of Breasts: breasts symmetrical, no skin changes, no discharge present, nipple appearance normal, no skin retraction present · Palpation of Breasts and Axillae: no masses present on palpation, no breast tenderness · Axillary Lymph Nodes: no lymphadenopathy present Gastrointestinal 
· Abdominal Examination: abdomen non-tender to palpation, normal bowel sounds, no masses present · Liver and spleen: no hepatomegaly present, spleen not palpable · Hernias: no hernias identified Skin · General Inspection: no rash, no lesions identified Neurologic/Psychiatric · Mental Status: · Orientation: grossly oriented to person, place and time · Mood and Affect: mood normal, affect appropriate Genitourinary · External Genitalia: normal appearance for age, no discharge present, no tenderness present, no inflammatory lesions present, no masses present, no atrophy present · Vagina: normal vaginal vault without central or paravaginal defects, no discharge present, no inflammatory lesions present, no masses present · Bladder: non-tender to palpation · Urethra: appears normal 
· Cervix: normal  
· Uterus: normal size, shape and consistency · Adnexa: no adnexal tenderness present, no adnexal masses present · Perineum: perineum within normal limits, no evidence of trauma, no rashes or skin lesions present · Anus: anus within normal limits, no hemorrhoids present · Inguinal Lymph Nodes: no lymphadenopathy present Assessment: 
Routine gynecologic examination Her current medical status is satisfactory with no evidence of significant gynecologic issues. Plan: 
Counseled re: diet, exercise, healthy lifestyle Return for yearly wellness visits Rec annual mammogram

## 2021-01-04 LAB
CYTOLOGIST CVX/VAG CYTO: ABNORMAL
CYTOLOGY CVX/VAG DOC CYTO: ABNORMAL
CYTOLOGY CVX/VAG DOC THIN PREP: ABNORMAL
DX ICD CODE: ABNORMAL
DX ICD CODE: ABNORMAL
HPV I/H RISK 1 DNA CVX QL PROBE+SIG AMP: NEGATIVE
Lab: ABNORMAL
OTHER STN SPEC: ABNORMAL
PATHOLOGIST CVX/VAG CYTO: ABNORMAL
STAT OF ADQ CVX/VAG CYTO-IMP: ABNORMAL

## 2021-01-21 ENCOUNTER — HOSPITAL ENCOUNTER (OUTPATIENT)
Dept: MAMMOGRAPHY | Age: 56
Discharge: HOME | End: 2021-01-21
Attending: FAMILY MEDICINE
Payer: OTHER GOVERNMENT

## 2021-01-21 DIAGNOSIS — Z12.31 SCREENING MAMMOGRAM FOR HIGH-RISK PATIENT: ICD-10-CM

## 2021-01-21 PROCEDURE — 77067 SCR MAMMO BI INCL CAD: CPT

## 2021-06-21 ENCOUNTER — TELEPHONE (OUTPATIENT)
Dept: FAMILY MEDICINE CLINIC | Age: 56
End: 2021-06-21

## 2021-06-21 NOTE — TELEPHONE ENCOUNTER
Dr. Franck Tejada needs  referral for diagnosis codes:  B65.3321, H40.021 faxed to [phone]  to Attn: Arturo Gipson [facility] office for visit 6/18/21. Provider referral already in [facility].  Eri

## 2021-06-22 NOTE — TELEPHONE ENCOUNTER
referral submitted and is pending approval, but unable to process as Dr. Zeny Wilson is not listed as pt's PCP. Pt advised and agrees to contact [facility] to update for referral to be completed as all other information needed has been submitted.   Eri

## 2022-02-02 ENCOUNTER — TELEPHONE (OUTPATIENT)
Dept: FAMILY MEDICINE CLINIC | Age: 57
End: 2022-02-02

## 2022-02-02 DIAGNOSIS — Z12.31 ENCOUNTER FOR SCREENING MAMMOGRAM FOR MALIGNANT NEOPLASM OF BREAST: Primary | ICD-10-CM

## 2022-02-02 NOTE — TELEPHONE ENCOUNTER
Spoke with pt, and she has been advised Dr. Ja Plaza is in network. I pulled up in network providers while patient was on phone, and pt states she will go on line to change PCP.

## 2022-02-02 NOTE — TELEPHONE ENCOUNTER
----- Message from Federico Durham sent at 2/2/2022  1:55 PM EST -----  Subject: Referral Request    QUESTIONS   Reason for referral request? Patient needs a referral for a mammogram. She   would like a call back. She said she needs it from Dr. Maximilian Merrill because Dr. Donnell Kicthen no longer accepts her insurance. Has the physician seen you for this condition before? No   Preferred Specialist (if applicable)? Do you already have an appointment scheduled? No  Additional Information for Provider?   ---------------------------------------------------------------------------  --------------  CALL BACK INFO  What is the best way for the office to contact you? OK to leave message on   voicemail  Preferred Call Back Phone Number?  [phone]

## 2022-02-16 ENCOUNTER — TELEPHONE (OUTPATIENT)
Dept: FAMILY MEDICINE CLINIC | Age: 57
End: 2022-02-16

## 2022-02-16 DIAGNOSIS — H40.1122 PRIMARY OPEN-ANGLE GLAUCOMA, LEFT EYE, MODERATE STAGE: Primary | ICD-10-CM

## 2022-02-16 NOTE — TELEPHONE ENCOUNTER
Radha referral request faxed by [facility] for pt's appointment 2/18/22 with Dr. Delmy Castillo. Diagnosis   H40.1122 (ICD-10-CM) - Primary open-angle glaucoma, left eye, moderate stage           (Pt not seen since 11/27/20 for in office visit with Dr. Chrissy Grant. )Ldm

## 2022-02-17 NOTE — TELEPHONE ENCOUNTER
Radha referral to Dr. Mar Parekh submitted, approved for 5 visits, effective 2/12/22 - 2/12/23, authorization number [id number], and faxed to office 2/17/22.  Eri

## 2022-03-17 ENCOUNTER — HOSPITAL ENCOUNTER (OUTPATIENT)
Dept: MAMMOGRAPHY | Age: 57
Discharge: HOME | End: 2022-03-17
Payer: OTHER GOVERNMENT

## 2022-03-17 ENCOUNTER — TRANSCRIBE ORDER (OUTPATIENT)
Dept: REGISTRATION | Age: 57
End: 2022-03-17

## 2022-03-17 DIAGNOSIS — Z12.31 OTHER SCREENING MAMMOGRAM: ICD-10-CM

## 2022-03-17 DIAGNOSIS — Z12.31 OTHER SCREENING MAMMOGRAM: Primary | ICD-10-CM

## 2022-03-17 PROCEDURE — 77067 SCR MAMMO BI INCL CAD: CPT

## 2022-03-18 ENCOUNTER — OFFICE VISIT (OUTPATIENT)
Dept: FAMILY MEDICINE CLINIC | Age: 57
End: 2022-03-18
Payer: OTHER GOVERNMENT

## 2022-03-18 VITALS
OXYGEN SATURATION: 99 % | TEMPERATURE: 98 F | HEART RATE: 97 BPM | HEIGHT: 60 IN | WEIGHT: 115 LBS | DIASTOLIC BLOOD PRESSURE: 80 MMHG | SYSTOLIC BLOOD PRESSURE: 132 MMHG | BODY MASS INDEX: 22.58 KG/M2 | RESPIRATION RATE: 20 BRPM

## 2022-03-18 DIAGNOSIS — E78.5 DYSLIPIDEMIA: ICD-10-CM

## 2022-03-18 DIAGNOSIS — Z00.00 WELL WOMAN EXAM (NO GYNECOLOGICAL EXAM): Primary | ICD-10-CM

## 2022-03-18 DIAGNOSIS — H40.9 GLAUCOMA OF LEFT EYE, UNSPECIFIED GLAUCOMA TYPE: ICD-10-CM

## 2022-03-18 PROBLEM — K63.5 COLON POLYP: Status: ACTIVE | Noted: 2020-11-27

## 2022-03-18 PROCEDURE — 99396 PREV VISIT EST AGE 40-64: CPT | Performed by: FAMILY MEDICINE

## 2022-03-18 NOTE — PROGRESS NOTES
Subjective:   62 y.o. female for Well Woman Check. Her gyne and breast care is done elsewhere by her Ob-Gyne physician. Patient Active Problem List   Diagnosis Code    Colon polyp K63.5    Family history of breast cancer in sister Z80.2     Past Medical History:   Diagnosis Date    Atypical squamous cells of undetermined significance (ASCUS) on Papanicolaou smear of cervix 12/18/2020     Past Surgical History:   Procedure Laterality Date    COLONOSCOPY N/A 12/17/2020    COLONOSCOPY performed by Rudi Campos MD at [facility]    HX BREAST BIOPSY Right 2007    Negative     Family History   Problem Relation Age of Onset    No Known Problems Mother     Cancer Father     Cancer Sister     Breast Cancer Sister 45     Social History     Tobacco Use    Smoking status: Never Smoker    Smokeless tobacco: Never Used   Substance Use Topics    Alcohol use: Not Currently        Lab Results   Component Value Date/Time    Cholesterol, total 263 (H) 11/30/2020 01:36 PM    HDL Cholesterol 92 11/30/2020 01:36 PM    LDL, calculated 159.6 (H) 11/30/2020 01:36 PM    LDL-C, External 96 12/01/2019 12:00 AM    Triglyceride 57 11/30/2020 01:36 PM    CHOL/HDL Ratio 2.9 11/30/2020 01:36 PM     Lab Results   Component Value Date/Time    Sodium 141 11/30/2020 01:36 PM    Potassium 4.3 11/30/2020 01:36 PM    Chloride 107 11/30/2020 01:36 PM    CO2 29 11/30/2020 01:36 PM    Anion gap 5 11/30/2020 01:36 PM    Glucose 87 11/30/2020 01:36 PM    BUN 8 11/30/2020 01:36 PM    Creatinine 0.63 11/30/2020 01:36 PM    BUN/Creatinine ratio 13 11/30/2020 01:36 PM    GFR est AA >60 11/30/2020 01:36 PM    GFR est non-AA >60 11/30/2020 01:36 PM    Calcium 9.5 11/30/2020 01:36 PM    Bilirubin, total 0.6 11/30/2020 01:36 PM    ALT (SGPT) 33 11/30/2020 01:36 PM    Alk.  phosphatase 58 11/30/2020 01:36 PM    Protein, total 7.9 11/30/2020 01:36 PM    Albumin 4.1 11/30/2020 01:36 PM    Globulin 3.8 11/30/2020 01:36 PM    A-G Ratio 1.1 11/30/2020 01:36 PM         ROS: Feeling generally well. No TIA's or unusual headaches, no dysphagia. No prolonged cough. No dyspnea or chest pain on exertion. No abdominal pain, change in bowel habits, black or bloody stools. No urinary tract symptoms. No new or unusual musculoskeletal symptoms. Specific concerns today: she is doing well  She took the stairs, wants to get bp rechecked now that she's seated  Agrees to shingles shot  She is utd on crc screen  She sees Dr Cecilia Rivera across the [place] for obgyn. Objective: The patient appears well, alert, oriented x 3, in no distress. Visit Vitals  /80   Pulse 97   Temp 98 °F (36.7 °C) (Temporal)   Resp 20   Ht 5' (1.524 m)   Wt 115 lb (52.2 kg)   SpO2 99%   BMI 22.46 kg/m²     ENT normal.  Neck supple. No adenopathy or thyromegaly. GERMÁN. Lungs are clear, good air entry, no wheezes, rhonchi or rales. S1 and S2 normal, no murmurs, regular rate and rhythm. Abdomen soft without tenderness, guarding, mass or organomegaly. Extremities show no edema, normal peripheral pulses. Neurological is normal, no focal findings. Breast and Pelvic exams are deferred. Assessment/Plan:   Well Woman  continue present plan, routine labs ordered, call if any problems    ICD-10-CM ICD-9-CM    1. Well woman exam (no gynecological exam)  Z00.00 V70.0 LIPID PANEL      METABOLIC PANEL, COMPREHENSIVE    [V70.0]   2. Glaucoma of left eye, unspecified glaucoma type  H40.9 365.9    3. Dyslipidemia  E78.5 272.4 LIPID PANEL      METABOLIC PANEL, COMPREHENSIVE   well female  Anticipatory guidance  Glaucoma per Dr Varela June fasting lipids  Call with concerns or follow annually  Keep up the good work!

## 2022-03-31 DIAGNOSIS — E78.5 DYSLIPIDEMIA: ICD-10-CM

## 2022-03-31 DIAGNOSIS — Z00.00 WELL WOMAN EXAM (NO GYNECOLOGICAL EXAM): ICD-10-CM

## 2022-04-01 LAB
ALBUMIN SERPL-MCNC: 4.2 G/DL (ref 3.5–5)
ALBUMIN/GLOB SERPL: 1.2 {RATIO} (ref 1.1–2.2)
ALP SERPL-CCNC: 54 U/L (ref 45–117)
ALT SERPL-CCNC: 29 U/L (ref 12–78)
ANION GAP SERPL CALC-SCNC: 4 MMOL/L (ref 5–15)
AST SERPL-CCNC: 18 U/L (ref 15–37)
BILIRUB SERPL-MCNC: 0.8 MG/DL (ref 0.2–1)
BUN SERPL-MCNC: 10 MG/DL (ref 6–20)
BUN/CREAT SERPL: 15 (ref 12–20)
CALCIUM SERPL-MCNC: 9.2 MG/DL (ref 8.5–10.1)
CHLORIDE SERPL-SCNC: 105 MMOL/L (ref 97–108)
CHOLEST SERPL-MCNC: 237 MG/DL
CO2 SERPL-SCNC: 29 MMOL/L (ref 21–32)
CREAT SERPL-MCNC: 0.68 MG/DL (ref 0.55–1.02)
GLOBULIN SER CALC-MCNC: 3.4 G/DL (ref 2–4)
GLUCOSE SERPL-MCNC: 83 MG/DL (ref 65–100)
HDLC SERPL-MCNC: 89 MG/DL
HDLC SERPL: 2.7 {RATIO} (ref 0–5)
LDLC SERPL CALC-MCNC: 136.6 MG/DL (ref 0–100)
POTASSIUM SERPL-SCNC: 4.4 MMOL/L (ref 3.5–5.1)
PROT SERPL-MCNC: 7.6 G/DL (ref 6.4–8.2)
SODIUM SERPL-SCNC: 138 MMOL/L (ref 136–145)
TRIGL SERPL-MCNC: 57 MG/DL (ref ?–150)
VLDLC SERPL CALC-MCNC: 11.4 MG/DL

## 2022-04-18 ENCOUNTER — VIRTUAL VISIT (OUTPATIENT)
Dept: FAMILY MEDICINE CLINIC | Age: 57
End: 2022-04-18
Payer: OTHER GOVERNMENT

## 2022-04-18 DIAGNOSIS — E78.5 DYSLIPIDEMIA: Primary | ICD-10-CM

## 2022-04-18 PROCEDURE — 99213 OFFICE O/P EST LOW 20 MIN: CPT | Performed by: FAMILY MEDICINE

## 2022-04-18 NOTE — PROGRESS NOTES
Matt Miranda is a 62 y.o. female who was seen by synchronous (real-time) audio-video technology on 4/18/2022. Consent: Matt Miranda, who was seen by synchronous (real-time) audio-video technology, and/or her healthcare decision maker, is aware that this patient-initiated, Telehealth encounter on 4/18/2022 is a billable service, with coverage as determined by her insurance carrier. She is aware that she may receive a bill and has provided verbal consent to proceed: Yes. Assessment & Plan:   1. Dyslipidemia  Lipids from last year and this year compared-improvement  ascvd calculated-2% over next 10 years  Healthy habits encouraged  cmp normal  F/u 1 year or sooner          Pt was counseled on risks, benefits and alternatives of treatment options. All questions were asked and answered and the patient was agreeable with the treatment plan as outlined. Subjective:   Matt Miranda is a 62 y.o. female who was seen for Results (discuss recent lab results )    The 10-year ASCVD risk score (Sami Jimenez, et al., 2013) is: 2%    Values used to calculate the score:      Age: 62 years      Sex: Female      Is Non- : No      Diabetic: No      Tobacco smoker: No      Systolic Blood Pressure: 982 mmHg      Is BP treated: No      HDL Cholesterol: 89 MG/DL      Total Cholesterol: 237 MG/DL    Had fasting labs after last visit in person  Improved LDL  Trig same  HDL essentially same    CMP normal    She is reassured by these findings  She's following a healthy lifestyle      Medications, allergies, PMH, PSH, SOCH, [address] reviewed and updated per routine protocol, see chart for review and changes if not noted here. ROS  A 12 point review of systems was negative except as noted here or in the HPI.     Objective:   Vital Signs: (As obtained by patient/caregiver at home)  Patient-Reported Vitals 4/15/2022   Patient-Reported Weight 110   Patient-Reported Height 51   Patient-Reported Pulse 65 [INSTRUCTIONS:  \"[x]\" Indicates a positive item  \"[]\" Indicates a negative item  -- DELETE ALL ITEMS NOT EXAMINED]    Constitutional: [x] Appears well-developed and well-nourished [x] No apparent distress      [] Abnormal -     Mental status: [x] Alert and awake  [x] Oriented to person/place/time [x] Able to follow commands    [] Abnormal -     Eyes:   EOM    [x]  Normal    [] Abnormal -   Sclera  [x]  Normal    [] Abnormal -          Discharge [x]  None visible   [] Abnormal -     HENT: [x] Normocephalic, atraumatic  [] Abnormal -   [x] Mouth/Throat: Mucous membranes are moist    External Ears [x] Normal  [] Abnormal -    Neck: [x] No visualized mass [] Abnormal -     Pulmonary/Chest: [x] Respiratory effort normal   [x] No visualized signs of difficulty breathing or respiratory distress        [] Abnormal -      Musculoskeletal:   [] Normal gait with no signs of ataxia         [x] Normal range of motion of neck        [] Abnormal -     Neurological:        [x] No Facial Asymmetry (Cranial nerve 7 motor function) (limited exam due to video visit)          [x] No gaze palsy        [] Abnormal -          Skin:        [x] No significant exanthematous lesions or discoloration noted on facial skin         [] Abnormal -            Psychiatric:       [x] Normal Affect [] Abnormal -        [x] No Hallucinations    Other pertinent observable physical exam findings:seated at computer, no distress, non toxic      We discussed the expected course, resolution and complications of the diagnosis(es) in detail. Medication risks, benefits, costs, interactions, and alternatives were discussed as indicated. I advised her to contact the office if her condition worsens, changes or fails to improve as anticipated. She expressed understanding with the diagnosis(es) and plan. Kennedy Aguilar is a 62 y.o. female who was evaluated by a video visit encounter for concerns as above.  Patient identification was verified prior to start of the visit. A caregiver was present when appropriate. Due to this being a TeleHealth encounter (During FYLHX-48 public health emergency), evaluation of the following organ systems was limited: Vitals/Constitutional/EENT/Resp/CV/GI//MS/Neuro/Skin/Heme-Lymph-Imm. Pursuant to the emergency declaration under the [facility], [facility] waiver authority and the [employer] and [employer] Act, this Virtual  Visit was conducted, with patient's (and/or legal guardian's) consent, to reduce the patient's risk of exposure to COVID-19 and provide necessary medical care. Services were provided through a video synchronous discussion virtually to substitute for in-person clinic visit. Patient and provider were located at their individual homes. Reji Moralez MD  [facility]  04/18/22 1:04 PM     Portions of this note may have been populated using smart dictation software and may have \"sounds-like\" errors present.

## 2022-07-01 ENCOUNTER — OFFICE VISIT (OUTPATIENT)
Dept: FAMILY MEDICINE CLINIC | Age: 57
End: 2022-07-01
Payer: OTHER GOVERNMENT

## 2022-07-01 VITALS
HEART RATE: 80 BPM | TEMPERATURE: 97.1 F | DIASTOLIC BLOOD PRESSURE: 71 MMHG | BODY MASS INDEX: 21.79 KG/M2 | HEIGHT: 60 IN | WEIGHT: 111 LBS | RESPIRATION RATE: 16 BRPM | OXYGEN SATURATION: 97 % | SYSTOLIC BLOOD PRESSURE: 118 MMHG

## 2022-07-01 DIAGNOSIS — H00.014 HORDEOLUM EXTERNUM OF LEFT UPPER EYELID: Primary | ICD-10-CM

## 2022-07-01 PROCEDURE — 99213 OFFICE O/P EST LOW 20 MIN: CPT | Performed by: FAMILY MEDICINE

## 2022-07-01 RX ORDER — ERYTHROMYCIN 5 MG/G
0.3 OINTMENT OPHTHALMIC EVERY 6 HOURS
Qty: 7 G | Refills: 0 | Status: SHIPPED | OUTPATIENT
Start: 2022-07-01 | End: 2022-07-11

## 2022-07-01 NOTE — PROGRESS NOTES
Family Medicine Follow-Up Progress Note  Patient: Glendy Garza  1965, 62 y.o., female  Encounter Date: 7/1/2022    ASSESSMENT & PLAN    ICD-10-CM ICD-9-CM    1. Hordeolum externum of left upper eyelid  H00.014 373.11        Orders Placed This Encounter    erythromycin (ILOTYCIN) ophthalmic ointment     Sig: Administer 0.3 g to left eye every six (6) hours for 10 days. Dispense:  7 g     Refill:  0       STYE  Given redness will add erythro eye ointment  Will do hot compressess 3-4 times daily, discussed how to safely use a hot potato to maintain heat longer in warm washcloth (don't burn yourself)  ER precaution if concern for developing cellulitis  See ophthalmology if not improving  CHIEF COMPLAINT  Chief Complaint   Patient presents with    Eye Problem     red raised bump on eye lid       Dakota Bartholomew is a 62 y.o. female presenting today for bump in upper eyelid for a few days, worsening, painful to touch  Vision not affected  Not draining  No systemic signs of infection either    ROS  Review of Systems  A 12 point review of systems was negative except as noted here or in the HPI. OBJECTIVE  Visit Vitals  /71   Pulse 80   Temp 97.1 °F (36.2 °C) (Temporal)   Resp 16   Ht 5' (1.524 m)   Wt 111 lb (50.3 kg)   SpO2 97%   BMI 21.68 kg/m²       Physical Exam  Vitals and nursing note reviewed. Constitutional:       General: She is not in acute distress. Appearance: Normal appearance. She is normal weight. She is not ill-appearing, toxic-appearing or diaphoretic. HENT:      Head: Normocephalic and atraumatic. Right Ear: External ear normal.      Left Ear: External ear normal.   Eyes:      General: No scleral icterus. Right eye: No discharge. Left eye: Hordeolum present. No discharge.         Comments: Mobile cystic mass in left upper outer eyelid, mild overlying redness and some edema noted    Neck:      Comments: No visible neck masses , ROM appears normal from visual inspection  Pulmonary:      Effort: Pulmonary effort is normal. No respiratory distress. Skin:     Comments: Visible skin is without jaundice, bruising, lesion, pallor, erythema or rash except as otherwise noted   Neurological:      General: No focal deficit present. Mental Status: She is alert and oriented to person, place, and time. Psychiatric:         Mood and Affect: Mood normal.         Behavior: Behavior normal.         Thought Content: Thought content normal.         Judgment: Judgment normal.         No results found for any visits on 07/01/22.     HISTORICAL  Reviewed and updated today, and as noted below:    Past Medical History:   Diagnosis Date    Atypical squamous cells of undetermined significance (ASCUS) on Papanicolaou smear of cervix 12/18/2020     Past Surgical History:   Procedure Laterality Date    COLONOSCOPY N/A 12/17/2020    COLONOSCOPY performed by Ildefonso Cooley MD at [facility]    HX BREAST BIOPSY Right 2007    Negative     Family History   Problem Relation Age of Onset    No Known Problems Mother     Cancer Father     Cancer Sister     Breast Cancer Sister 45     Social History     Tobacco Use   Smoking Status Never Smoker   Smokeless Tobacco Never Used     Social History     Socioeconomic History    Marital status:    Tobacco Use    Smoking status: Never Smoker    Smokeless tobacco: Never Used   Vaping Use    Vaping Use: Never used   Substance and Sexual Activity    Alcohol use: Not Currently    Drug use: Not Currently     No Known Allergies    LAB REVIEW  Lab Results   Component Value Date/Time    Sodium 138 03/31/2022 02:45 PM    Potassium 4.4 03/31/2022 02:45 PM    Chloride 105 03/31/2022 02:45 PM    CO2 29 03/31/2022 02:45 PM    Anion gap 4 (L) 03/31/2022 02:45 PM    Glucose 83 03/31/2022 02:45 PM    BUN 10 03/31/2022 02:45 PM    Creatinine 0.68 03/31/2022 02:45 PM    BUN/Creatinine ratio 15 03/31/2022 02:45 PM    GFR est AA >60 03/31/2022 02:45 PM    GFR est non-AA >60 03/31/2022 02:45 PM    Calcium 9.2 03/31/2022 02:45 PM    Bilirubin, total 0.8 03/31/2022 02:45 PM    Alk. phosphatase 54 03/31/2022 02:45 PM    Protein, total 7.6 03/31/2022 02:45 PM    Albumin 4.2 03/31/2022 02:45 PM    Globulin 3.4 03/31/2022 02:45 PM    A-G Ratio 1.2 03/31/2022 02:45 PM    ALT (SGPT) 29 03/31/2022 02:45 PM     Lab Results   Component Value Date/Time    WBC 6.1 11/30/2020 01:36 PM    HGB 14.1 11/30/2020 01:36 PM    HCT 43.5 11/30/2020 01:36 PM    PLATELET 792 99/52/2072 01:36 PM    MCV 94.6 11/30/2020 01:36 PM     No results found for: HBA1C, QNO0GVXB, OES5VBUS, JUO5HTUD  Lab Results   Component Value Date/Time    Cholesterol, total 237 (H) 03/31/2022 02:45 PM    HDL Cholesterol 89 03/31/2022 02:45 PM    LDL, calculated 136.6 (H) 03/31/2022 02:45 PM    VLDL, calculated 11.4 03/31/2022 02:45 PM    Triglyceride 57 03/31/2022 02:45 PM    CHOL/HDL Ratio 2.7 03/31/2022 02:45 PM           [facility] MD Christofer  [facility]  07/01/22 10:43 AM    Portions of this note may have been populated using smart dictation software and may have \"sounds-like\" errors present. Pt was counseled on risks, benefits and alternatives of treatment options. All questions were asked and answered and the patient was agreeable with the treatment plan as outlined.

## 2022-07-01 NOTE — PROGRESS NOTES
Chief Complaint   Patient presents with    Eye Problem     red raised bump on eye lid     1. \"Have you been to the ER, urgent care clinic since your last visit? Hospitalized since your last visit? \" No    2. \"Have you seen or consulted any other health care providers outside of the [address] [place] [address] since your last visit? \" No     3. For patients aged 39-70: Has the patient had a colonoscopy / FIT/ Cologuard? Yes - no Care Gap present      If the patient is female:    4. For patients aged 41-77: Has the patient had a mammogram within the past 2 years? Yes - no Care Gap present      5. For patients aged 21-65: Has the patient had a pap smear?  Yes - no Care Gap present

## 2023-01-17 ENCOUNTER — TELEPHONE (OUTPATIENT)
Dept: FAMILY MEDICINE CLINIC | Age: 58
End: 2023-01-17

## 2023-03-23 ENCOUNTER — OFFICE VISIT (OUTPATIENT)
Dept: FAMILY MEDICINE CLINIC | Age: 58
End: 2023-03-23
Payer: OTHER GOVERNMENT

## 2023-03-23 VITALS
BODY MASS INDEX: 22.38 KG/M2 | SYSTOLIC BLOOD PRESSURE: 123 MMHG | WEIGHT: 114 LBS | RESPIRATION RATE: 20 BRPM | HEIGHT: 60 IN | HEART RATE: 87 BPM | OXYGEN SATURATION: 98 % | TEMPERATURE: 97.7 F | DIASTOLIC BLOOD PRESSURE: 86 MMHG

## 2023-03-23 DIAGNOSIS — Z80.3 FAMILY HISTORY OF BREAST CANCER IN SISTER: ICD-10-CM

## 2023-03-23 DIAGNOSIS — Z00.00 WELL WOMAN EXAM (NO GYNECOLOGICAL EXAM): Primary | ICD-10-CM

## 2023-03-23 DIAGNOSIS — Z12.31 ENCOUNTER FOR SCREENING MAMMOGRAM FOR MALIGNANT NEOPLASM OF BREAST: ICD-10-CM

## 2023-03-23 DIAGNOSIS — E78.5 DYSLIPIDEMIA: ICD-10-CM

## 2023-03-23 PROCEDURE — 99396 PREV VISIT EST AGE 40-64: CPT | Performed by: FAMILY MEDICINE

## 2023-03-23 NOTE — PROGRESS NOTES
Subjective:   62 y.o. female for Well Woman Check. Her gyne and breast care is done elsewhere by her Ob-Gyne physician. Patient Active Problem List   Diagnosis Code    Colon polyp K63.5    Family history of breast cancer in sister Z80.2     Past Medical History:   Diagnosis Date    Atypical squamous cells of undetermined significance (ASCUS) on Papanicolaou smear of cervix 12/18/2020     Past Surgical History:   Procedure Laterality Date    COLONOSCOPY N/A 12/17/2020    COLONOSCOPY performed by Ken Ochoa MD at [facility]COPY    HX BREAST BIOPSY Right 2007    Negative     Family History   Problem Relation Age of Onset    No Known Problems Mother     Cancer Father     Cancer Sister     Breast Cancer Sister 45     Social History     Tobacco Use    Smoking status: Never    Smokeless tobacco: Never   Substance Use Topics    Alcohol use: Not Currently        Lab Results   Component Value Date/Time    Cholesterol, total 237 (H) 03/31/2022 02:45 PM    HDL Cholesterol 89 03/31/2022 02:45 PM    LDL, calculated 136.6 (H) 03/31/2022 02:45 PM    LDL-C, External 96 12/01/2019 12:00 AM    Triglyceride 57 03/31/2022 02:45 PM    CHOL/HDL Ratio 2.7 03/31/2022 02:45 PM     Lab Results   Component Value Date/Time    Sodium 138 03/31/2022 02:45 PM    Potassium 4.4 03/31/2022 02:45 PM    Chloride 105 03/31/2022 02:45 PM    CO2 29 03/31/2022 02:45 PM    Anion gap 4 (L) 03/31/2022 02:45 PM    Glucose 83 03/31/2022 02:45 PM    BUN 10 03/31/2022 02:45 PM    Creatinine 0.68 03/31/2022 02:45 PM    BUN/Creatinine ratio 15 03/31/2022 02:45 PM    GFR est AA >60 03/31/2022 02:45 PM    GFR est non-AA >60 03/31/2022 02:45 PM    Calcium 9.2 03/31/2022 02:45 PM    Bilirubin, total 0.8 03/31/2022 02:45 PM    ALT (SGPT) 29 03/31/2022 02:45 PM    Alk.  phosphatase 54 03/31/2022 02:45 PM    Protein, total 7.6 03/31/2022 02:45 PM    Albumin 4.2 03/31/2022 02:45 PM    Globulin 3.4 03/31/2022 02:45 PM    A-G Ratio 1.2 03/31/2022 02:45 PM         ROS: Feeling generally well. No TIA's or unusual headaches, no dysphagia. No prolonged cough. No dyspnea or chest pain on exertion. No abdominal pain, change in bowel habits, black or bloody stools. No urinary tract symptoms. No new or unusual musculoskeletal symptoms. Specific concerns today: doing well no concerns. Objective: The patient appears well, alert, oriented x 3, in no distress. Visit Vitals  /86   Pulse 87   Temp 97.7 °F (36.5 °C) (Temporal)   Resp 20   Ht 5' (1.524 m)   Wt 114 lb (51.7 kg)   SpO2 98%   BMI 22.26 kg/m²     ENT normal.  Neck supple. No adenopathy or thyromegaly. GERMÁN. Lungs are clear, good air entry, no wheezes, rhonchi or rales. S1 and S2 normal, no murmurs, regular rate and rhythm. Abdomen soft without tenderness, guarding, mass or organomegaly. Extremities show no edema, normal peripheral pulses. Neurological is normal, no focal findings. Breast and Pelvic exams are deferred. Assessment/Plan:   Well Woman  increase physical activity, routine labs ordered, call if any problems    ICD-10-CM ICD-9-CM    1. Well woman exam (no gynecological exam)  Z00.00 V70.0 LIPID PANEL      varicella-zoster recombinant, PF, (SHINGRIX) 50 mcg/0.5 mL susr injection      METABOLIC PANEL, COMPREHENSIVE    [V70.0]      2. Encounter for screening mammogram for malignant neoplasm of breast  Z12.31 V76.12 LEVI MAMMO BI SCREENING INCL CAD      3. Family history of breast cancer in sister  Z80.2 V17.4 LEVI MAMMO BI SCREENING INCL CAD      4.  Dyslipidemia  E78.5 272.4 LIPID PANEL      METABOLIC PANEL, COMPREHENSIVE      Recommend continue exercise  Eat a healthy high fiber diet  Continue to see dentist and eye doctor  Schedule your pap due this year  Mammo tomorrow scheduled already  Fasting lab work per my orders to surveil dyslipidemia

## 2023-03-23 NOTE — PROGRESS NOTES
Chief Complaint   Patient presents with    Physical     Patient states she has no concerns   Fasting for labs   Mammogram scheduled for tomorrow, 03/24/2023

## 2023-03-24 ENCOUNTER — TRANSCRIBE ORDER (OUTPATIENT)
Dept: EMERGENCY DEPT | Age: 58
End: 2023-03-24

## 2023-03-24 ENCOUNTER — HOSPITAL ENCOUNTER (OUTPATIENT)
Dept: MAMMOGRAPHY | Age: 58
Discharge: HOME | End: 2023-03-24
Payer: OTHER GOVERNMENT

## 2023-03-24 DIAGNOSIS — Z92.89 HISTORY OF MAMMOGRAM: ICD-10-CM

## 2023-03-24 DIAGNOSIS — Z92.89 HISTORY OF MAMMOGRAM: Primary | ICD-10-CM

## 2023-03-24 PROCEDURE — 77067 SCR MAMMO BI INCL CAD: CPT

## (undated) DEVICE — CATH IV AUTOGRD BC BLU 22GA 25 -- INSYTE

## (undated) DEVICE — SYR 3ML LL TIP 1/10ML GRAD --

## (undated) DEVICE — SYR 5ML 1/5 GRAD LL NSAF LF --

## (undated) DEVICE — Device

## (undated) DEVICE — ELECTRODE,RADIOTRANSLUCENT,FOAM,3PK: Brand: MEDLINE

## (undated) DEVICE — 1200 GUARD II KIT W/5MM TUBE W/O VAC TUBE: Brand: GUARDIAN

## (undated) DEVICE — ADULT SPO2 SENSOR: Brand: NELLCOR

## (undated) DEVICE — SET ADMIN 16ML TBNG L100IN 2 Y INJ SITE IV PIGGY BK DISP

## (undated) DEVICE — BASIN EMSIS 16OZ GRAPHITE PLAS KID SHP MOLD GRAD FOR ORAL

## (undated) DEVICE — KIT COLON W/ 1.1OZ LUB AND 2 END

## (undated) DEVICE — SOLIDIFIER MEDC 1200ML -- CONVERT TO 356117

## (undated) DEVICE — NDL PRT INJ NSAF BLNT 18GX1.5 --

## (undated) DEVICE — CANN NASAL O2 CAPNOGRAPHY AD -- FILTERLINE

## (undated) DEVICE — BAG BELONG PT PERS CLEAR HANDL

## (undated) DEVICE — NDL FLTR TIP 5 MIC 18GX1.5IN --